# Patient Record
Sex: FEMALE | Race: WHITE | URBAN - METROPOLITAN AREA
[De-identification: names, ages, dates, MRNs, and addresses within clinical notes are randomized per-mention and may not be internally consistent; named-entity substitution may affect disease eponyms.]

---

## 2017-03-24 ENCOUNTER — APPOINTMENT (OUTPATIENT)
Dept: OPTOMETRY | Facility: CLINIC | Age: 52
End: 2017-03-24
Payer: MEDICAID

## 2017-03-24 PROCEDURE — 92341 FIT SPECTACLES BIFOCAL: CPT | Performed by: OPTOMETRIST

## 2017-10-01 ENCOUNTER — HEALTH MAINTENANCE LETTER (OUTPATIENT)
Age: 52
End: 2017-10-01

## 2018-02-21 ENCOUNTER — THERAPY VISIT (OUTPATIENT)
Dept: PHYSICAL THERAPY | Facility: CLINIC | Age: 53
End: 2018-02-21
Payer: COMMERCIAL

## 2018-02-21 DIAGNOSIS — M25.511 ACUTE PAIN OF RIGHT SHOULDER: Primary | ICD-10-CM

## 2018-02-21 DIAGNOSIS — Z98.890 S/P SHOULDER SURGERY: ICD-10-CM

## 2018-02-21 PROCEDURE — 97110 THERAPEUTIC EXERCISES: CPT | Mod: GP | Performed by: PHYSICAL THERAPIST

## 2018-02-21 PROCEDURE — 97161 PT EVAL LOW COMPLEX 20 MIN: CPT | Mod: GP | Performed by: PHYSICAL THERAPIST

## 2018-02-21 NOTE — PROGRESS NOTES
[facility] [facility] Initial Evaluation  Subjective:  Patient is a 52 year old female presenting with rehab right shoulder hpi. The history is provided by the patient.   Debbie Ford is a 52 year old female with a right shoulder condition.  Condition occurred with:  Unknown cause.  Condition occurred: for unknown reasons.  This is a chronic condition  DOS 1/27/2018.  Pain started in November.  Possibly due to being a ?.    Patient reports pain:  Anterior and posterior.  Radiates to:  Upper arm.  Pain is described as aching and is constant and reported as 9/10.  Associated symptoms:  Loss of motion/stiffness and loss of strength. Pain is the same all the time.  Exacerbated by: currently not using arm. and relieved by bracing/immobilizing.  Since onset symptoms are unchanged.  Special tests:  MRI (prior to surgery which showed RCT).      General health as reported by patient is fair.  Pertinent medical history includes:  Rheumatoid arthritis, osteoarthritis, high blood pressure and fibromyalgia.  Medical allergies: no.  Other surgeries include:  None reported.  Current medications:  Pain medication, muscle relaxants and high blood pressure medication.  Current occupation is .    Primary job tasks include:  Lifting.    Barriers include:  None as reported by the patient.    Red flags:  None as reported by the patient.                        Objective:  Standing Alignment:      Shoulder/UE:  Protracted scapula R (guarded in sling)                                       Shoulder Evaluation:  ROM:  AROM:  not assessed                            PROM:    Flexion:  Right: 65      Abduction:  Right:  70                          Strength:  not assessed                          Palpation:  Palpation assessed shoulder: diffuse tenderness throughout.                                         General     ROS    Assessment/Plan:    Patient is a 52 year old female with right side shoulder complaints.     Patient has the following significant findings with corresponding treatment plan.                Diagnosis 1:  R RCR  Pain -  hot/cold therapy, manual therapy, self management, education, directional preference exercise and home program  Decreased ROM/flexibility - manual therapy and therapeutic exercise  Decreased strength - therapeutic exercise and therapeutic activities  Impaired muscle performance - neuro re-education  Decreased function - therapeutic activities    Therapy Evaluation Codes:   1) History comprised of:   Personal factors that impact the plan of care:      None.    Comorbidity factors that impact the plan of care are:      Fibromyalgia, High blood pressure, Migraines/headaches, Osteoarthritis and Rheumatoid arthritis.     Medications impacting care: High blood pressure, Muscle relaxant and Pain.  2) Examination of Body Systems comprised of:   Body structures and functions that impact the plan of care:      Shoulder.   Activity limitations that impact the plan of care are:      Bathing, Driving, Lifting and Working.  3) Clinical presentation characteristics are:   Stable/Uncomplicated.  4) Decision-Making    Low complexity using standardized patient assessment instrument and/or measureable assessment of functional outcome.  Cumulative Therapy Evaluation is: Low complexity.    Previous and current functional limitations:  (See Goal Flow Sheet for this information)    Short term and Long term goals: (See Goal Flow Sheet for this information)     Communication ability:  Patient appears to be able to clearly communicate and understand verbal and written communication and follow directions correctly.  Treatment Explanation - The following has been discussed with the patient:   RX ordered/plan of care  Anticipated outcomes  Possible risks and side effects  This patient would benefit from PT intervention to resume normal activities.   Rehab potential is good.    Frequency:  2 X week, once  daily  Duration:  for 6 weeks  Discharge Plan:  Achieve all LTG.  Independent in home treatment program.  Reach maximal therapeutic benefit.    Please refer to the daily flowsheet for treatment today, total treatment time and time spent performing 1:1 timed codes.

## 2018-02-21 NOTE — LETTER
[facility]  [address] [facility] [address]  [phone]    2018    Re: Debbie Ford   :   1965  MRN:  [id number]   REFERRING PHYSICIAN:   Surinder Landa  [facility]  Date of Initial Evaluation:  2018  Visits:1  Rxs Used: 1  Reason for Referral:     Acute pain of right shoulder  S/P shoulder surgery  EVALUATION SUMMARY  [facility]  Subjective:  Patient is a 52 year old female presenting with rehab right shoulder hpi. The history is provided by the patient.   Debbie Ford is a 52 year old female with a right shoulder condition.  Condition occurred with:  Unknown cause.  Condition occurred: for unknown reasons.  This is a chronic condition  DOS 2018.  Pain started in November.  Possibly due to being a ?.    Patient reports pain:  Anterior and posterior.  Radiates to:  Upper arm.  Pain is described as aching and is constant and reported as 9/10.  Associated symptoms:  Loss of motion/stiffness and loss of strength. Pain is the same all the time.  Exacerbated by: currently not using arm. and relieved by bracing/immobilizing.  Since onset symptoms are unchanged.  Special tests:  MRI (prior to surgery which showed RCT).      General health as reported by patient is fair.  Pertinent medical history includes:  Rheumatoid arthritis, osteoarthritis, high blood pressure and fibromyalgia.  Medical allergies: no.  Other surgeries include:  None reported.  Current medications:  Pain medication, muscle relaxants and high blood pressure medication.  Current occupation is .    Primary job tasks include:  Lifting.  Barriers include:  None as reported by the patient.  Red flags:  None as reported by the patient.  Objective:  Standing Alignment:    Shoulder/UE:  Protracted scapula R (guarded in sling)    Shoulder Evaluation:  ROM:  AROM:  not  assessed   PROM:    Flexion:  Right: 65    Abduction:  Right:  70  Strength:  not assessed  Palpation:  Palpation assessed shoulder: diffuse tenderness throughout.  General   ROS  Assessment/Plan:    Patient is a 52 year old female with right side shoulder complaints.    Patient has the following significant findings with corresponding treatment plan.                Re: Debbie Ford   :   1965  Diagnosis 1:  R RCR  Pain -  hot/cold therapy, manual therapy, self management, education, directional preference exercise and home program  Decreased ROM/flexibility - manual therapy and therapeutic exercise  Decreased strength - therapeutic exercise and therapeutic activities  Impaired muscle performance - neuro re-education  Decreased function - therapeutic activities  Therapy Evaluation Codes:   1) History comprised of:   Personal factors that impact the plan of care:      None.    Comorbidity factors that impact the plan of care are:      Fibromyalgia, High blood pressure, Migraines/headaches, Osteoarthritis and Rheumatoid arthritis.     Medications impacting care: High blood pressure, Muscle relaxant and Pain.  2) Examination of Body Systems comprised of:   Body structures and functions that impact the plan of care:      Shoulder.   Activity limitations that impact the plan of care are:      Bathing, Driving, Lifting and Working.  3) Clinical presentation characteristics are:   Stable/Uncomplicated.  4) Decision-Making    Low complexity using standardized patient assessment instrument and/or measureable assessment of functional outcome.  Cumulative Therapy Evaluation is: Low complexity.  Previous and current functional limitations:  (See Goal Flow Sheet for this information)    Short term and Long term goals: (See Goal Flow Sheet for this information)   Communication ability:  Patient appears to be able to clearly communicate and understand verbal and written communication and follow directions  correctly.  Treatment Explanation - The following has been discussed with the patient:   RX ordered/plan of care  Anticipated outcomes  Possible risks and side effects  This patient would benefit from PT intervention to resume normal activities.   Rehab potential is good.  Frequency:  2 X week, once daily  Duration:  for 6 weeks  Discharge Plan:  Achieve all LTG.  Independent in home treatment program.  Reach maximal therapeutic benefit.  Thank you for your referral.  INQUIRIES  Therapist: Levra Bloom PT  [facility]  [address] [facility] [address]  Phone: [phone]  Fax: [phone]

## 2018-02-21 NOTE — MR AVS SNAPSHOT
After Visit Summary   2/21/2018    Debbie Ford    MRN: [id number]           Patient Information     Date Of Birth          1965        Visit Information        Provider Department      2/21/2018 9:00 AM Levar Bloom, PT [facility]        Today's Diagnoses     Acute pain of right shoulder    -  1    S/P shoulder surgery           Follow-ups after your visit        Your next 10 appointments already scheduled     Feb 27, 2018  8:30 AM CST   NEHE[facility]IA[facility] Extremity with Levar Bloom PT   [place] [facility] [place] [facility] ([facility])    [address] [facility] [address]   [phone]            Mar 01, 2018  8:50 AM CST   NEHEMIA[facility] Extremity with Levar Bloom PT   [facility] ([facility])    [address] [facility] [address]   [phone]            Mar 06, 2018  9:10 AM CST   NEHEMIAH Extremity with Levar Bloom, PT   [facility] ([facility])    [address] [facility] [address]   [phone]            Mar 08, 2018  8:50 AM CST   NEHEMIAH Extremity with Levar Bloom PT   [facility] ([facility])    [address] [facility] [address]   [phone]            Mar 13, 2018  8:30 AM CDT   NEHEMIAH Extremity with Levar Bloom PT   [facility]    [address] [facility] [address]   [phone]            Mar 15, 2018  8:50 AM CDT   NEHEMIAH Extremity with Levar Bloom PT   [facility] ([facility])    [address] [facility] [address]   [phone]              Who to contact     If you have questions or need follow up information about today's clinic visit or your schedule please  "contact [place] [facility] directly at [phone].  Normal or non-critical lab and imaging results will be communicated to you by MyChart, letter or phone within 4 business days after the clinic has received the results. If you do not hear from us within 7 days, please contact the clinic through MyChart or phone. If you have a critical or abnormal lab result, we will notify you by phone as soon as possible.  Submit refill requests through [employer] or call your pharmacy and they will forward the refill request to us. Please allow 3 business days for your refill to be completed.          Additional Information About Your Visit        Microstim[employer] Puppet Information     [employer] lets you send messages to your doctor, view your test results, renew your prescriptions, schedule appointments and more. To sign up, go to [email or web address] . Click on \"Log in\" on the left side of the screen, which will take you to the Welcome page. Then click on \"Sign up Now\" on the right side of the page.     You will be asked to enter the access code listed below, as well as some personal information. Please follow the directions to create your username and password.     Your access code is: [email or web address]  Expires: 2018 11:58 AM     Your access code will  in 90 days. If you need help or a new code, please call your [place] clinic or [phone].        Care EveryWhere ID     This is your Care EveryWhere ID. This could be used by other organizations to access your [place] medical records  [id number]         Blood Pressure from Last 3 Encounters:   No data found for BP    Weight from Last 3 Encounters:   No data found for Wt              We Performed the Following     HC PT EVAL, LOW COMPLEXITY     NEHEMIAH INITIAL EVAL REPORT     THERAPEUTIC EXERCISES        Primary Care Provider Office Phone # Fax #    Naomi Reddy [phone]       [facility] [address] [place] " [address]        Equal Access to Services     [facility] : Hadii aad ku hadaracelivale Jannetteali, wamarlenireinaldo shawsantanaha, edisonkaitlyn brownpammatty rowland. So [facility] [phone].    ATENCIÓN: Si habla español, tiene a beckett disposición servicios gratuitos de asistencia lingüística. Llame al [phone].    We comply with applicable federal civil rights laws and [place] laws. We do not discriminate on the basis of race, color, national origin, age, disability, sex, sexual orientation, or gender identity.            Thank you!     Thank you for choosing [place] [facility]  for your care. Our goal is always to provide you with excellent care. Hearing back from our patients is one way we can continue to improve our services. Please take a few minutes to complete the written survey that you may receive in the mail after your visit with us. Thank you!             Your Updated Medication List - Protect others around you: Learn how to safely use, store and throw away your medicines at www.disposemymeds.org.          This list is accurate as of 2/21/18 11:58 AM.  Always use your most recent med list.                   Brand Name Dispense Instructions for use Diagnosis    OMEPRAZOLE PO      Take 150 capsules by mouth 2 times daily.

## 2018-03-06 ENCOUNTER — THERAPY VISIT (OUTPATIENT)
Dept: PHYSICAL THERAPY | Facility: CLINIC | Age: 53
End: 2018-03-06
Payer: COMMERCIAL

## 2018-03-06 DIAGNOSIS — M25.511 ACUTE PAIN OF RIGHT SHOULDER: ICD-10-CM

## 2018-03-06 DIAGNOSIS — Z98.890 S/P SHOULDER SURGERY: ICD-10-CM

## 2018-03-06 PROCEDURE — 97110 THERAPEUTIC EXERCISES: CPT | Mod: GP | Performed by: PHYSICAL THERAPIST

## 2018-03-08 ENCOUNTER — THERAPY VISIT (OUTPATIENT)
Dept: PHYSICAL THERAPY | Facility: CLINIC | Age: 53
End: 2018-03-08
Payer: COMMERCIAL

## 2018-03-08 DIAGNOSIS — M25.511 ACUTE PAIN OF RIGHT SHOULDER: ICD-10-CM

## 2018-03-08 DIAGNOSIS — Z98.890 S/P SHOULDER SURGERY: ICD-10-CM

## 2018-03-08 PROCEDURE — 97110 THERAPEUTIC EXERCISES: CPT | Mod: GP | Performed by: PHYSICAL THERAPIST

## 2018-03-13 ENCOUNTER — THERAPY VISIT (OUTPATIENT)
Dept: PHYSICAL THERAPY | Facility: CLINIC | Age: 53
End: 2018-03-13
Payer: COMMERCIAL

## 2018-03-13 DIAGNOSIS — Z98.890 S/P SHOULDER SURGERY: ICD-10-CM

## 2018-03-13 DIAGNOSIS — M25.511 ACUTE PAIN OF RIGHT SHOULDER: ICD-10-CM

## 2018-03-13 PROCEDURE — 97110 THERAPEUTIC EXERCISES: CPT | Mod: GP | Performed by: PHYSICAL THERAPIST

## 2018-03-15 ENCOUNTER — THERAPY VISIT (OUTPATIENT)
Dept: PHYSICAL THERAPY | Facility: CLINIC | Age: 53
End: 2018-03-15
Payer: COMMERCIAL

## 2018-03-15 DIAGNOSIS — Z98.890 S/P SHOULDER SURGERY: ICD-10-CM

## 2018-03-15 DIAGNOSIS — M25.511 ACUTE PAIN OF RIGHT SHOULDER: ICD-10-CM

## 2018-03-15 PROCEDURE — 97110 THERAPEUTIC EXERCISES: CPT | Mod: GP | Performed by: PHYSICAL THERAPIST

## 2018-03-20 ENCOUNTER — THERAPY VISIT (OUTPATIENT)
Dept: PHYSICAL THERAPY | Facility: CLINIC | Age: 53
End: 2018-03-20
Payer: COMMERCIAL

## 2018-03-20 DIAGNOSIS — M25.511 ACUTE PAIN OF RIGHT SHOULDER: ICD-10-CM

## 2018-03-20 DIAGNOSIS — Z98.890 S/P SHOULDER SURGERY: ICD-10-CM

## 2018-03-20 PROCEDURE — 97110 THERAPEUTIC EXERCISES: CPT | Mod: GP | Performed by: PHYSICAL THERAPIST

## 2018-03-20 NOTE — LETTER
[place] [facility]  [address]  [facility] [address]  [phone]    2018    Re: Debbie Ford   :   1965  MRN:  [id number]   REFERRING PHYSICIAN:   Surinder Landa    [place] [facility]    Date of Initial Evaluation:  2018  Visits:  Rxs Used: 6  Reason for Referral:     Acute pain of right shoulder  S/P shoulder surgery    EVALUATION SUMMARY      PROGRESS  REPORT  Progress reporting period is from 2018 to 3/20/2018.       SUBJECTIVE  Subjective changes noted by patient:   MD tomorrow.  Reached under chair on  and grabbed a book which increased sx in shoulder and arm.      Current pain level is : 5/10.     Previous pain level was  : 9/10.   Changes in function:  None and ROM improving slowly  Adverse reaction to treatment or activity: None  OBJECTIVE  PROM flex 104  abd 100    AROM flex 65   ROM limited by pain.  ASSESSMENT/PLAN  Updated problem list and treatment plan:Diagnosis 1:  R RCR  Pain -  hot/cold therapy, manual therapy, self management, education, directional preference exercise and home program  Decreased ROM/flexibility - manual therapy and therapeutic exercise  Decreased strength - therapeutic exercise and therapeutic activities  Impaired muscle performance - neuro re-education  Decreased function - therapeutic activities    STG/LTGs have been met or progress has been made towards goals:  ROM improving  Assessment of Progress: The patient's condition is improving.  Pt has increased pain x 2 due to falls or reaching with arm.  Self Management Plans:  Patient has been instructed in a home treatment program.    Debbie continues to require the following intervention to meet STG and LTG's:  PT    Recommendations:  This patient would benefit from continued therapy.     Frequency:  2 X week, once daily  Duration:  for 4 -6 weeks  Re: Debbie Ford   :   1965    Recent  increases in pain have limited progress.                Thank you for your referral.    INQUIRIES  Therapist: Levar Bloom PT  [facility]  [address] [place] [address]  Phone: [phone]  Fax: [phone]

## 2018-03-20 NOTE — MR AVS SNAPSHOT
After Visit Summary   3/20/2018    Debbie Ford    MRN: [id number]           Patient Information     Date Of Birth          1965        Visit Information        Provider Department      3/20/2018 9:10 AM Levar Bloom, PT [facility] PT        Today's Diagnoses     Acute pain of right shoulder        S/P shoulder surgery           Follow-ups after your visit        Your next 10 appointments already scheduled     Mar 22, 2018  8:50 AM CDT   NEHE[place]IA[place] Extremity with Levar Bloom PT   [facility] PT ([facility])    [address] [facility] [address]   [phone]            Mar 27, 2018  8:30 AM CDT   NEHEMIA[facility] Extremity with Pat Roberts PT   [facility])    [address] [facility] [address]   [phone]            Mar 29, 2018  9:30 AM CDT   NE[place]IA[place] Extremity with Pat Roberts PT   [facility] PT ([facility])    [address] [facility] [place] [address]   [phone]            Apr 03, 2018  8:30 AM CDT   [place] Extremity with Levar Bloom PT   [facility])    [address] [facility] [address]   [phone]            Apr 05, 2018  8:50 AM CDT   NE[place]MIA[place] Extremity with Levar Bloom PT   [facility] ([facility])    [address] [facility] [address]   [phone]              Who to contact     If you have questions or need follow up information about today's clinic visit or your schedule please contact [facility] PT directly at [phone].  Normal or non-critical lab and imaging results will be communicated to you by MyChart, letter or phone within 4 business days after the clinic has  "received the results. If you do not hear from us within 7 days, please contact the clinic through [employer]In[employer] or phone. If you have a critical or abnormal lab result, we will notify you by phone as soon as possible.  Submit refill requests through [employer]Inbox or call your pharmacy and they will forward the refill request to us. Please allow 3 business days for your refill to be completed.          Additional Information About Your Visit        PowerInbox Information     [employer] lets you send messages to your doctor, view your test results, renew your prescriptions, schedule appointments and more. To sign up, go to [email or web address] . Click on \"Log in\" on the left side of the screen, which will take you to the Welcome page. Then click on \"Sign up Now\" on the right side of the page.     You will be asked to enter the access code listed below, as well as some personal information. Please follow the directions to create your username and password.     Your access code is: [email or web address]  Expires: 2018 12:58 PM     Your access code will  in 90 days. If you need help or a new code, please call your [place] clinic or [phone].        Care EveryWhere ID     This is your Care EveryWhere ID. This could be used by other organizations to access your [place] medical records  [id number]         Blood Pressure from Last 3 Encounters:   No data found for BP    Weight from Last 3 Encounters:   No data found for Wt              We Performed the Following     NEHEMIAH PROGRESS NOTES REPORT     THERAPEUTIC EXERCISES        Primary Care Provider Office Phone # Fax #    Naomi Reddy [phone]       [facility] [address] [place] [address]        Equal Access to Services     [facility]atorium[facility]FF : Arlen Blackwell, jean-claude geronimo, qaybmatty elizabeth. So [facility] [phone].    ATENCIÓN: Si habla español, tiene a beckett disposición servicios " cally de asistencia lingüística. David carr [phone].    We comply with applicable federal civil rights laws and [place] laws. We do not discriminate on the basis of race, color, national origin, age, disability, sex, sexual orientation, or gender identity.            Thank you!     Thank you for choosing [place] [facility]  for your care. Our goal is always to provide you with excellent care. Hearing back from our patients is one way we can continue to improve our services. Please take a few minutes to complete the written survey that you may receive in the mail after your visit with us. Thank you!             Your Updated Medication List - Protect others around you: Learn how to safely use, store and throw away your medicines at www.disposemymeds.org.          This list is accurate as of 3/20/18  9:52 AM.  Always use your most recent med list.                   Brand Name Dispense Instructions for use Diagnosis    OMEPRAZOLE PO      Take 150 capsules by mouth 2 times daily.

## 2018-03-20 NOTE — PROGRESS NOTES
Subjective:  HPI                    Objective:  System    Physical Exam    General     ROS    Assessment/Plan:    PROGRESS  REPORT    Progress reporting period is from 2/21/2018 to 3/20/2018.       SUBJECTIVE  Subjective changes noted by patient:   MD tomorrow.  Reached under chair on Sunday and grabbed a book which increased sx in shoulder and arm.      Current pain level is : 5/10.     Previous pain level was  : 9/10.   Changes in function:  None and ROM improving slowly  Adverse reaction to treatment or activity: None    OBJECTIVE  PROM flex 104  abd 100    AROM flex 65   ROM limited by pain.    ASSESSMENT/PLAN  Updated problem list and treatment plan:Diagnosis 1:  R RCR  Pain -  hot/cold therapy, manual therapy, self management, education, directional preference exercise and home program  Decreased ROM/flexibility - manual therapy and therapeutic exercise  Decreased strength - therapeutic exercise and therapeutic activities  Impaired muscle performance - neuro re-education  Decreased function - therapeutic activities      STG/LTGs have been met or progress has been made towards goals:  ROM improving  Assessment of Progress: The patient's condition is improving.  Pt has increased pain x 2 due to falls or reaching with arm.  Self Management Plans:  Patient has been instructed in a home treatment program.    Debbie continues to require the following intervention to meet STG and LTG's:  PT    Recommendations:  This patient would benefit from continued therapy.     Frequency:  2 X week, once daily  Duration:  for 4 -6 weeks      Recent increases in pain have limited progress.      Please refer to the daily flowsheet for treatment today, total treatment time and time spent performing 1:1 timed codes.

## 2018-03-22 ENCOUNTER — THERAPY VISIT (OUTPATIENT)
Dept: PHYSICAL THERAPY | Facility: CLINIC | Age: 53
End: 2018-03-22
Payer: COMMERCIAL

## 2018-03-22 DIAGNOSIS — Z98.890 S/P SHOULDER SURGERY: ICD-10-CM

## 2018-03-22 DIAGNOSIS — M25.511 ACUTE PAIN OF RIGHT SHOULDER: ICD-10-CM

## 2018-03-22 PROCEDURE — 97110 THERAPEUTIC EXERCISES: CPT | Mod: GP | Performed by: PHYSICAL THERAPIST

## 2018-03-22 PROCEDURE — 97112 NEUROMUSCULAR REEDUCATION: CPT | Mod: GP | Performed by: PHYSICAL THERAPIST

## 2018-04-05 ENCOUNTER — THERAPY VISIT (OUTPATIENT)
Dept: PHYSICAL THERAPY | Facility: CLINIC | Age: 53
End: 2018-04-05
Payer: COMMERCIAL

## 2018-04-05 DIAGNOSIS — Z98.890 S/P SHOULDER SURGERY: ICD-10-CM

## 2018-04-05 DIAGNOSIS — M25.511 ACUTE PAIN OF RIGHT SHOULDER: ICD-10-CM

## 2018-04-05 PROCEDURE — 97112 NEUROMUSCULAR REEDUCATION: CPT | Mod: GP | Performed by: PHYSICAL THERAPIST

## 2018-04-05 PROCEDURE — 97110 THERAPEUTIC EXERCISES: CPT | Mod: GP | Performed by: PHYSICAL THERAPIST

## 2018-04-12 ENCOUNTER — THERAPY VISIT (OUTPATIENT)
Dept: PHYSICAL THERAPY | Facility: CLINIC | Age: 53
End: 2018-04-12
Payer: COMMERCIAL

## 2018-04-12 DIAGNOSIS — Z98.890 S/P SHOULDER SURGERY: ICD-10-CM

## 2018-04-12 DIAGNOSIS — M25.511 ACUTE PAIN OF RIGHT SHOULDER: ICD-10-CM

## 2018-04-12 PROCEDURE — 97110 THERAPEUTIC EXERCISES: CPT | Mod: GP | Performed by: PHYSICAL THERAPIST

## 2018-04-12 PROCEDURE — 97112 NEUROMUSCULAR REEDUCATION: CPT | Mod: GP | Performed by: PHYSICAL THERAPIST

## 2018-04-12 NOTE — PROGRESS NOTES
Subjective:  HPI                    Objective:  System    Physical Exam    General     ROS    Assessment/Plan:    DISCHARGE REPORT    Progress reporting period is from 2/21/2018 to 4/12/2018.       SUBJECTIVE  Subjective changes noted by patient:  Sore today.  Will be going to another clinic for RX as she is going to the pain clinic there and they want her to do PT there also.    Current pain level is  5/10.     Previous pain level was  9/10.   Changes in function:  Yes (See Goal flowsheet attached for changes in current functional level)  Adverse reaction to treatment or activity: None    OBJECTIVE  Objective: PROM flex 136  abd 117    AROM flex 113  abd 91   ROM limited by pain    ASSESSMENT/PLAN  Updated problem list and treatment plan: Diagnosis 1:  R RCR  Pain -  hot/cold therapy, manual therapy, self management, education, directional preference exercise and home program  Decreased ROM/flexibility - manual therapy and therapeutic exercise  Decreased strength - therapeutic exercise and therapeutic activities  Impaired muscle performance - neuro re-education  Decreased function - therapeutic activities      STG/LTGs have been met or progress has been made towards goals:  Yes (See Goal flow sheet completed today.)  Assessment of Progress: The patient's condition is improving.  Patient is meeting short term goals and is progressing towards long term goals.  Self Management Plans:  Patient has been instructed in a home treatment program.    Debbie continues to require the following intervention to meet STG and LTG's:  PT intervention is no longer required to meet STG/LTG.    Recommendations:  This patient is ready to be discharged from therapy and continue their home treatment program.  Pt will be transferring to another PT location to continue their PT.    Please refer to the daily flowsheet for treatment today, total treatment time and time spent performing 1:1 timed codes.

## 2018-04-12 NOTE — MR AVS SNAPSHOT
"              After Visit Summary   2018    Debbie Ford    MRN: [id number]           Patient Information     Date Of Birth          1965        Visit Information        Provider Department      2018 8:50 AM Levar Bloom, PT [facility]        Today's Diagnoses     Acute pain of right shoulder        S/P shoulder surgery           Follow-ups after your visit        Who to contact     If you have questions or need follow up information about today's clinic visit or your schedule please contact [facility] directly at [phone].  Normal or non-critical lab and imaging results will be communicated to you by [employer], letter or phone within 4 business days after the clinic has received the results. If you do not hear from us within 7 days, please contact the clinic through [employer] or phone. If you have a critical or abnormal lab result, we will notify you by phone as soon as possible.  Submit refill requests through [employer] or call your pharmacy and they will forward the refill request to us. Please allow 3 business days for your refill to be completed.          Additional Information About Your Visit        MyChart Information     [employer] lets you send messages to your doctor, view your test results, renew your prescriptions, schedule appointments and more. To sign up, go to [email or web address] . Click on \"Log in\" on the left side of the screen, which will take you to the Welcome page. Then click on \"Sign up Now\" on the right side of the page.     You will be asked to enter the access code listed below, as well as some personal information. Please follow the directions to create your username and password.     Your access code is: [email or web address]  Expires: 2018 12:58 PM     Your access code will  in 90 days. If you need help or a new code, please call your [place] clinic or [phone].        Care " EveryWhere ID     This is your Care EveryWhere ID. This could be used by other organizations to access your [place] medical records  [id number]         Blood Pressure from Last 3 Encounters:   No data found for BP    Weight from Last 3 Encounters:   No data found for Wt              We Performed the Following     NEHEMIA[facility] PROGRESS NOTES REPORT     NEUROMUSCULAR RE-EDUCATION     THERAPEUTIC EXERCISES        Primary Care Provider Office Phone # Fax #    Naomi Reddy [phone]       [facility] [address] [place] [address]        Equal Access to Services     [facility] : Hadii aad ku hadasho Soomaali, waaxda luqadaha, qaybta kaalmada adeegyada, waxay idiin hayaan adeeg kharash laolvin . So [facility] [phone].    ATENCIÓN: Si habla español, tiene a beckett disposición servicios gratuitos de asistencia lingüística. Ll[facility] [phone].    We comply with applicable federal civil rights laws and [place] laws. We do not discriminate on the basis of race, color, national origin, age, disability, sex, sexual orientation, or gender identity.            Thank you!     Thank you for choosing [facility]  for your care. Our goal is always to provide you with excellent care. Hearing back from our patients is one way we can continue to improve our services. Please take a few minutes to complete the written survey that you may receive in the mail after your visit with us. Thank you!             Your Updated Medication List - Protect others around you: Learn how to safely use, store and throw away your medicines at www.disposemymeds.org.          This list is accurate as of 4/12/18  9:52 AM.  Always use your most recent med list.                   Brand Name Dispense Instructions for use Diagnosis    OMEPRAZOLE PO      Take 150 capsules by mouth 2 times daily.

## 2018-04-12 NOTE — LETTER
[place] [facility]  [address]  [facility] [address]  [phone]  2018  Re: Debbie Ford   :   1965  MRN:  [id number]   REFERRING PHYSICIAN:   Surinder Landa  [place] [facility]  Date of Initial Evaluation:2018  Visits: 9 Rxs Used: 9  Reason for Referral:     Acute pain of right shoulder  S/P shoulder surgery  Assessment/Plan:    DISCHARGE REPORT  Progress reporting period is from 2018 to 2018.     SUBJECTIVE  Subjective changes noted by patient:  Sore today.  Will be going to another clinic for RX as she is going to the pain clinic there and they want her to do PT there also.    Current pain level is  5/10.     Previous pain level was  9/10.   Changes in function:  Yes (See Goal flowsheet attached for changes in current functional level)  Adverse reaction to treatment or activity: None  OBJECTIVE  Objective: PROM flex 136  abd 117    AROM flex 113  abd 91   ROM limited by pain  ASSESSMENT/PLAN  Updated problem list and treatment plan: Diagnosis 1:  R RCR  Pain -  hot/cold therapy, manual therapy, self management, education, directional preference exercise and home program  Decreased ROM/flexibility - manual therapy and therapeutic exercise  Decreased strength - therapeutic exercise and therapeutic activities  Impaired muscle performance - neuro re-education  Decreased function - therapeutic activities  STG/LTGs have been met or progress has been made towards goals:  Yes (See Goal flow sheet completed today.)  Assessment of Progress: The patient's condition is improving.  Patient is meeting short term goals and is progressing towards long term goals.  Self Management Plans:  Patient has been instructed in a home treatment program.  Debbie continues to require the following intervention to meet STG and LTG's:  PT intervention is no longer required to meet STG/LTG.  Recommendations:  This  patient is ready to be discharged from therapy and continue their home treatment program.  Pt will be transferring to another PT location to continue their PT.  Thank you for your referral.  INQUIRIES  Therapist: Levar Bloom PT   [facility]  [address] [address]  Phone: [phone]  Fax: [phone]